# Patient Record
Sex: MALE | Race: OTHER | URBAN - METROPOLITAN AREA
[De-identification: names, ages, dates, MRNs, and addresses within clinical notes are randomized per-mention and may not be internally consistent; named-entity substitution may affect disease eponyms.]

---

## 2020-05-07 ENCOUNTER — OFFICE VISIT (OUTPATIENT)
Dept: FAMILY MEDICINE CLINIC | Age: 45
End: 2020-05-07

## 2020-05-07 DIAGNOSIS — I10 ESSENTIAL HYPERTENSION: Primary | ICD-10-CM

## 2020-05-07 RX ORDER — IRBESARTAN AND HYDROCHLOROTHIAZIDE 150; 12.5 MG/1; MG/1
1 TABLET, FILM COATED ORAL DAILY
Qty: 90 TAB | Refills: 0 | Status: SHIPPED | OUTPATIENT
Start: 2020-05-07

## 2020-05-07 NOTE — PROGRESS NOTES
Allana Lesch is a 45 y.o. male evaluated via telephone at [phone] on 5/7/2020. Patient identification verified with 2 identifiers. He is a new patient to the [address] [place] [address]. Consent:  He and/or health care decision maker has provided verbal consent to proceed: Yes Pursuant to the emergency declaration under the [address], [facility] authority and the [employer] Act, this Virtual Telephone  Visit was conducted to reduce the patient's risk of exposure to COVID-19. Chief Complaint   Patient presents with    Medication Refill   coabrovel 150/12.5 for blood pressure; irbesartan 150/12.5  Taking this for 3 years. He is not measuring his blood pressures. No hospitalizations. Diagnosed 3 years ago. HPI  Review of Systems   Cardiovascular: Negative for chest pain and leg swelling. No dyspnea at rest or with exertion     Documentation:  I communicated with the patient and/or health care decision maker about HTN. Details of this discussion including any medical advice provided: no added salt, low sodium diet, take medication daily, return 2 months for nonurgent labs (cmp, fasting lipids) which I am not ordering at this time, if still in the area and needing refills, before meds run out. Signs/symptoms/reasons to go to ER discussed. Diagnoses and all orders for this visit:    1. Essential hypertension  -     irbesartan-hydroCHLOROthiazide (AVALIDE) 150-12.5 mg per tablet; Take 1 Tab by mouth daily. I affirm this is a Patient Initiated Episode with a Patient who has not had a related appointment within my department in the past 7 days or scheduled within the next 24 hours.   Total Time: minutes: 11-20 minutes  Quin Jorge NP

## 2023-05-19 RX ORDER — IRBESARTAN AND HYDROCHLOROTHIAZIDE 150; 12.5 MG/1; MG/1
1 TABLET, FILM COATED ORAL DAILY
COMMUNITY
Start: 2020-05-07